# Patient Record
Sex: MALE | Race: WHITE | NOT HISPANIC OR LATINO | Employment: FULL TIME | URBAN - METROPOLITAN AREA
[De-identification: names, ages, dates, MRNs, and addresses within clinical notes are randomized per-mention and may not be internally consistent; named-entity substitution may affect disease eponyms.]

---

## 2018-10-03 ENCOUNTER — TELEPHONE (OUTPATIENT)
Dept: NEUROLOGY | Facility: CLINIC | Age: 48
End: 2018-10-03

## 2018-10-25 ENCOUNTER — OFFICE VISIT (OUTPATIENT)
Dept: NEUROLOGY | Facility: CLINIC | Age: 48
End: 2018-10-25
Payer: COMMERCIAL

## 2018-10-25 VITALS
SYSTOLIC BLOOD PRESSURE: 130 MMHG | WEIGHT: 310 LBS | BODY MASS INDEX: 45.91 KG/M2 | HEART RATE: 112 BPM | HEIGHT: 69 IN | DIASTOLIC BLOOD PRESSURE: 74 MMHG

## 2018-10-25 DIAGNOSIS — G62.9 NEUROPATHY: Primary | ICD-10-CM

## 2018-10-25 DIAGNOSIS — M54.16 RADICULOPATHY, LUMBAR REGION: ICD-10-CM

## 2018-10-25 PROCEDURE — 99244 OFF/OP CNSLTJ NEW/EST MOD 40: CPT | Performed by: PSYCHIATRY & NEUROLOGY

## 2018-10-25 RX ORDER — NAPROXEN 500 MG/1
400 TABLET ORAL 2 TIMES DAILY WITH MEALS
COMMUNITY

## 2018-10-25 NOTE — ASSESSMENT & PLAN NOTE
This patient recently had an incident at work, and has been having lower back pain radiating to the lower extremities, right more than more than the left, associated with slight weakness in the right ankle dorsiflexors, invertors and evertors consistent with a L5 radiculopathy  This is supported by his imaging studies which did show a disc herniation at L4-5 level, compressing the right more than the left neuroforamen  His electrodiagnostic studies did show active denervation at L4-S1 levels bilaterally, suggesting active radiculopathy however clinically he only has weakness in the right L5 distribution  Also was noted on his electrodiagnostic studies was absent sensory responses and decreased motor responses in both feet suggesting a generalized sensory motor polyneuropathy, which led to the referral today  Clinically, he did not have any symptoms of neuropathy prior to this incident, and this is an incidental finding  We talked about causes of neuropathy, and I told him the most common cause of neuropathy is diabetes  Beyond diabetes, metabolic syndrome with borderline blood sugars, blood pressure and dyslipidemia is one of the common causes of neuropathy which likely is the etiology in this patient  We talked about other reversible metabolic/autoimmune causes, and I recommended some blood work as noted below  He understands the role of lifestyle modification, regular exercise, regular small frequent meals with leaned protein, avoiding excess carbohydrates, weight loss in order to slow down the progression of neuropathy  I did tell him there is no medication which can improve the nerve function, the best way to control neuropathies is to control the underlying cause  So far, there is no role of immunotherapy  I will follow up on the above-mentioned testing, and he will continue to follow up with his surgeon for the lower back    He has been doing physical therapy and does feel the numbness and weakness have been getting better  I agree with the conservative approach, and encouraged him to continue physical therapy, as he has been doing  I will see him back for follow-up in 6 months, to ensure his neuropathy is not a rapidly progressive process

## 2018-10-25 NOTE — LETTER
October 25, 2018     Juan C Valdez MD  [address] [address]    Patient: Jorge Stephenson   YOB: 1970   Date of Visit: 10/25/2018       Dear Dr Villafana Simpler:    Thank you for referring Jorge Stephenson to me for evaluation  Below are my notes for this consultation  If you have questions, please do not hesitate to call me  I look forward to following your patient along with you  Sincerely,        Yaakov Calhoun MD        CC: Cristy Claire MD  10/25/2018  3:14 PM  Sign at close encounter  Patient ID: Jorge Stephenson is a 46 y o  male  Assessment/Plan:    Neuropathy  This patient recently had an incident at work, and has been having lower back pain radiating to the lower extremities, right more than more than the left, associated with slight weakness in the right ankle dorsiflexors, invertors and evertors consistent with a L5 radiculopathy  This is supported by his imaging studies which did show a disc herniation at L4-5 level, compressing the right more than the left neuroforamen  His electrodiagnostic studies did show active denervation at L4-S1 levels bilaterally, suggesting active radiculopathy however clinically he only has weakness in the right L5 distribution  Also was noted on his electrodiagnostic studies was absent sensory responses and decreased motor responses in both feet suggesting a generalized sensory motor polyneuropathy, which led to the referral today  Clinically, he did not have any symptoms of neuropathy prior to this incident, and this is an incidental finding  We talked about causes of neuropathy, and I told him the most common cause of neuropathy is diabetes  Beyond diabetes, metabolic syndrome with borderline blood sugars, blood pressure and dyslipidemia is one of the common causes of neuropathy which likely is the etiology in this patient   We talked about other reversible metabolic/autoimmune causes, and I recommended some blood work as noted below  He understands the role of lifestyle modification, regular exercise, regular small frequent meals with leaned protein, avoiding excess carbohydrates, weight loss in order to slow down the progression of neuropathy  I did tell him there is no medication which can improve the nerve function, the best way to control neuropathies is to control the underlying cause  So far, there is no role of immunotherapy  I will follow up on the above-mentioned testing, and he will continue to follow up with his surgeon for the lower back  He has been doing physical therapy and does feel the numbness and weakness have been getting better  I agree with the conservative approach, and encouraged him to continue physical therapy, as he has been doing  I will see him back for follow-up in 6 months, to ensure his neuropathy is not a rapidly progressive process  Diagnoses and all orders for this visit:    Neuropathy  -     Sedimentation rate, automated; Future  -     ELLEN Screen w/ Reflex to Titer/Pattern; Future  -     TSH, 3rd generation with Free T4 reflex; Future  -     Vitamin B12; Future  -     Vitamin B6; Future  -     Protein electrophoresis, serum; Future    Radiculopathy, lumbar region    Thank you very much for allowing me to participate in his care  Subjective:    HPI  I had the pleasure of seeing your patient in Neurology Clinic today  As you know, he is a 57-year-old man who was referred for evaluation for peripheral neuropathy  Please allow me to summarize his history for the record  He works in a farm, 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, 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  Shortly after, he started having lower back pain  The pain was dull for a few days and progressively got worse  The pain would radiate to the right leg, and he started noticing numbnmess in the right foot   A few weeks later, similac pain in the left leg, and intermittent numbness in the left foot  The pain in the legs is better, still has numbness in the feet, and is getting intermittent sharp pain in both buttocks  He feels right foot is weaker, feels it is gradually getting better  He was not able to walk without assistance initially when it happened, but is able to walk better now, without any assistive devices  No muscle cramps or twitching  Never had any numbness or tingling in his feet prior to the accident, no balance problems  Has h/o CTS b/l, had surgery a few years ago  H/O hep C 5-6 years ago, was treated  Does not have known DM, he has been told he is borderline  Borderline HTN  Former smoker, no family hx of any neuromuscular diseases  Brought in some blood work from 9/20, showed dyslipidemia, t cholesterol 204, HDL 36, ,   HCV RNA was neg, PSA was normal  TSH normal, hba1c was 5 7 CBC neg  He had electrodiagnostic studies in September 2018,   Her bilateral sural sensory responses were reported absent  Peroneal motor distal Cmaps were reduced bilaterally with reduced conduction velocity  Bilateral tibial motor see maps were also reduced with slightly slow conduction velocity  Increased insertional activity with fibrillations and positive sharp waves were reported in bilateral L4-5 muscles, and also were reported in bilateral L4-S1 paraspinal muscles  These are best interpreted as generalized, predominantly axonal sensory motor neuropathy, with superimposed radiculopathy  MRI of the lumbar spine was reported to show moderate degenerative disc disease at L4-5 and severe distally degenerated disc disease at L5-S1, very large disc protrusion was reported at L4-5, comprising the right more than the left neuroforamen      The following portions of the patient's history were reviewed and updated as appropriate: allergies, current medications, past family history, past medical history, past social history, past surgical history and problem list         Objective:    Blood pressure 130/74, pulse (!) 112, height 5' 9" (1 753 m), weight (!) 141 kg (310 lb)  Physical Exam  General exam: Pt was awake, alert and oriented  HEENT: atraumatic, normocephalic  Normal oral mucosa, neck was supple, no lymphadenopathy  Normal peripheral pulses  Extremities did not show any edema or cyanosis  Neurological Exam  Neurologically, pt was awake and alert  Speech was normal, no dysarthria or aphasia  Cranial nerve exam showed normal extraocular movements, no nystagmus or diplopia  There was no ptosis at baseline or with sustained upward gaze  Strength of eye closure muscles was normal   Facial sensations were normal bilaterally  No facial weakness, able to blow out the cheeks and push the tongue in the cheeks well  No tongue atrophy or fasciculations  Motor exam revealed normal tone and muscle bulk  There was no atrophy, scapular winging,high arches, hammertoes, shortening of achilles tendons or any other features of neuromuscular disease  Muscle strength was normal in neck flexors and extensors, and all muscle groups in both upper extremities  Strength in the lower extremities was as follows (right/left): Hip flexors 5/5, Knee extensors 5/5, knee flexors 5/5, ankle dorsiflexors 4/5, ankle plantar flexors 5/5, ankle invertors 4+/5, evertors 4/5  Reflexes were graded as 2 in the upper extremities, 2 at the knees and absent at the ankles  Toes were downgoing  There was no exaggerated jaw jerk or ortiz's sign  No ankle clonus  Sensory exam revealed length dependent, decreased sensation to pin and temp up to ankles bilaterally, has superimposed decreased sensation ion the L5 distribution, mainly in the lateral leg and dorsum of the right foot  Vibration was mildly reduced at toes  Proprioception was intact  Rhomberg showed mild swaying, with a 10 inch stance     He worked with slightly wide base, and slight steppage on right foot because of mild weakness  ROS:  I reviewed the below ROS and what is mentioned in HPI, the remainder of ROS was negative  Review of Systems   Constitutional: Negative  Negative for appetite change and fever  Recent weight gain   HENT: Negative  Negative for hearing loss, tinnitus, trouble swallowing and voice change  Eyes: Negative  Negative for photophobia and pain  Respiratory: Negative  Negative for shortness of breath  Cardiovascular: Negative  Negative for palpitations  Gastrointestinal: Negative  Negative for nausea and vomiting  Endocrine: Negative  Negative for cold intolerance and heat intolerance  Genitourinary: Negative  Negative for dysuria, frequency and urgency  Musculoskeletal: Positive for back pain  Negative for myalgias and neck pain  Skin: Negative  Negative for rash  Neurological: Positive for numbness (both feet)  Negative for dizziness, tremors, seizures, syncope, facial asymmetry, speech difficulty, weakness, light-headedness and headaches  Tingling, Pain while walking   Hematological: Negative  Does not bruise/bleed easily  Psychiatric/Behavioral: Negative  Negative for confusion, hallucinations and sleep disturbance

## 2018-10-25 NOTE — PROGRESS NOTES
Patient ID: Rashmi Liang is a 46 y o  male  Assessment/Plan:    Neuropathy  This patient recently had an incident at work, and has been having lower back pain radiating to the lower extremities, right more than more than the left, associated with slight weakness in the right ankle dorsiflexors, invertors and evertors consistent with a L5 radiculopathy  This is supported by his imaging studies which did show a disc herniation at L4-5 level, compressing the right more than the left neuroforamen  His electrodiagnostic studies did show active denervation at L4-S1 levels bilaterally, suggesting active radiculopathy however clinically he only has weakness in the right L5 distribution  Also was noted on his electrodiagnostic studies was absent sensory responses and decreased motor responses in both feet suggesting a generalized sensory motor polyneuropathy, which led to the referral today  Clinically, he did not have any symptoms of neuropathy prior to this incident, and this is an incidental finding  We talked about causes of neuropathy, and I told him the most common cause of neuropathy is diabetes  Beyond diabetes, metabolic syndrome with borderline blood sugars, blood pressure and dyslipidemia is one of the common causes of neuropathy which likely is the etiology in this patient  We talked about other reversible metabolic/autoimmune causes, and I recommended some blood work as noted below  He understands the role of lifestyle modification, regular exercise, regular small frequent meals with leaned protein, avoiding excess carbohydrates, weight loss in order to slow down the progression of neuropathy  I did tell him there is no medication which can improve the nerve function, the best way to control neuropathies is to control the underlying cause  So far, there is no role of immunotherapy       I will follow up on the above-mentioned testing, and he will continue to follow up with his surgeon for the lower back  He has been doing physical therapy and does feel the numbness and weakness have been getting better  I agree with the conservative approach, and encouraged him to continue physical therapy, as he has been doing  I will see him back for follow-up in 6 months, to ensure his neuropathy is not a rapidly progressive process  Diagnoses and all orders for this visit:    Neuropathy  -     Sedimentation rate, automated; Future  -     ELLEN Screen w/ Reflex to Titer/Pattern; Future  -     TSH, 3rd generation with Free T4 reflex; Future  -     Vitamin B12; Future  -     Vitamin B6; Future  -     Protein electrophoresis, serum; Future    Radiculopathy, lumbar region    Thank you very much for allowing me to participate in his care  Subjective:    HPI  I had the pleasure of seeing your patient in Neurology Clinic today  As you know, he is a 57-year-old man who was referred for evaluation for peripheral neuropathy  Please allow me to summarize his history for the record  He works in a farm, 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, 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  Shortly after, he started having lower back pain  The pain was dull for a few days and progressively got worse  The pain would radiate to the right leg, and he started noticing numbnmess in the right foot  A few weeks later, similac pain in the left leg, and intermittent numbness in the left foot  The pain in the legs is better, still has numbness in the feet, and is getting intermittent sharp pain in both buttocks  He feels right foot is weaker, feels it is gradually getting better  He was not able to walk without assistance initially when it happened, but is able to walk better now, without any assistive devices  No muscle cramps or twitching  Never had any numbness or tingling in his feet prior to the accident, no balance problems  Has h/o CTS b/l, had surgery a few years ago  H/O hep C 5-6 years ago, was treated   Does not have known DM, he has been told he is borderline  Borderline HTN  Former smoker, no family hx of any neuromuscular diseases  Brought in some blood work from 9/20, showed dyslipidemia, t cholesterol 204, HDL 36, ,   HCV RNA was neg, PSA was normal  TSH normal, hba1c was 5 7 CBC neg  He had electrodiagnostic studies in September 2018,   Her bilateral sural sensory responses were reported absent  Peroneal motor distal Cmaps were reduced bilaterally with reduced conduction velocity  Bilateral tibial motor see maps were also reduced with slightly slow conduction velocity  Increased insertional activity with fibrillations and positive sharp waves were reported in bilateral L4-5 muscles, and also were reported in bilateral L4-S1 paraspinal muscles  These are best interpreted as generalized, predominantly axonal sensory motor neuropathy, with superimposed radiculopathy  MRI of the lumbar spine was reported to show moderate degenerative disc disease at L4-5 and severe distally degenerated disc disease at L5-S1, very large disc protrusion was reported at L4-5, comprising the right more than the left neuroforamen  The following portions of the patient's history were reviewed and updated as appropriate: allergies, current medications, past family history, past medical history, past social history, past surgical history and problem list         Objective:    Blood pressure 130/74, pulse (!) 112, height 5' 9" (1 753 m), weight (!) 141 kg (310 lb)  Physical Exam  General exam: Pt was awake, alert and oriented  HEENT: atraumatic, normocephalic  Normal oral mucosa, neck was supple, no lymphadenopathy  Normal peripheral pulses  Extremities did not show any edema or cyanosis  Neurological Exam  Neurologically, pt was awake and alert  Speech was normal, no dysarthria or aphasia  Cranial nerve exam showed normal extraocular movements, no nystagmus or diplopia     There was no ptosis at baseline or with sustained upward gaze  Strength of eye closure muscles was normal   Facial sensations were normal bilaterally  No facial weakness, able to blow out the cheeks and push the tongue in the cheeks well  No tongue atrophy or fasciculations  Motor exam revealed normal tone and muscle bulk  There was no atrophy, scapular winging,high arches, hammertoes, shortening of achilles tendons or any other features of neuromuscular disease  Muscle strength was normal in neck flexors and extensors, and all muscle groups in both upper extremities  Strength in the lower extremities was as follows (right/left): Hip flexors 5/5, Knee extensors 5/5, knee flexors 5/5, ankle dorsiflexors 4/5, ankle plantar flexors 5/5, ankle invertors 4+/5, evertors 4/5  Reflexes were graded as 2 in the upper extremities, 2 at the knees and absent at the ankles  Toes were downgoing  There was no exaggerated jaw jerk or ortiz's sign  No ankle clonus  Sensory exam revealed length dependent, decreased sensation to pin and temp up to ankles bilaterally, has superimposed decreased sensation ion the L5 distribution, mainly in the lateral leg and dorsum of the right foot  Vibration was mildly reduced at toes  Proprioception was intact  Rhomberg showed mild swaying, with a 10 inch stance  He worked with slightly wide base, and slight steppage on right foot because of mild weakness  ROS:  I reviewed the below ROS and what is mentioned in HPI, the remainder of ROS was negative  Review of Systems   Constitutional: Negative  Negative for appetite change and fever  Recent weight gain   HENT: Negative  Negative for hearing loss, tinnitus, trouble swallowing and voice change  Eyes: Negative  Negative for photophobia and pain  Respiratory: Negative  Negative for shortness of breath  Cardiovascular: Negative  Negative for palpitations  Gastrointestinal: Negative  Negative for nausea and vomiting  Endocrine: Negative  Negative for cold intolerance and heat intolerance  Genitourinary: Negative  Negative for dysuria, frequency and urgency  Musculoskeletal: Positive for back pain  Negative for myalgias and neck pain  Skin: Negative  Negative for rash  Neurological: Positive for numbness (both feet)  Negative for dizziness, tremors, seizures, syncope, facial asymmetry, speech difficulty, weakness, light-headedness and headaches  Tingling, Pain while walking   Hematological: Negative  Does not bruise/bleed easily  Psychiatric/Behavioral: Negative  Negative for confusion, hallucinations and sleep disturbance

## 2018-10-26 ENCOUNTER — TELEPHONE (OUTPATIENT)
Dept: NEUROLOGY | Facility: CLINIC | Age: 48
End: 2018-10-26

## 2018-10-26 NOTE — TELEPHONE ENCOUNTER
----- Message from Keyanna Abarca sent at 10/26/2018  9:04 AM EDT -----  Regarding: Visit Follow-Up Question  Contact: [phone]  Hi, I wanted to see how long before the full office note will be available from yesterday's visit?     Thank you,  Ashley Ham

## 2018-10-30 LAB
ALBUMIN SERPL ELPH-MCNC: 4.2 G/DL (ref 2.9–4.4)
ALBUMIN/GLOB SERPL: 1.2 {RATIO} (ref 0.7–1.7)
ALPHA1 GLOB SERPL ELPH-MCNC: 0.2 G/DL (ref 0–0.4)
ALPHA2 GLOB SERPL ELPH-MCNC: 0.7 G/DL (ref 0.4–1)
ANA TITR SER IF: NEGATIVE {TITER}
B-GLOBULIN SERPL ELPH-MCNC: 1.2 G/DL (ref 0.7–1.3)
ERYTHROCYTE [SEDIMENTATION RATE] IN BLOOD BY WESTERGREN METHOD: 12 MM/HR (ref 0–30)
GAMMA GLOB SERPL ELPH-MCNC: 1.4 G/DL (ref 0.4–1.8)
GLOBULIN SER CALC-MCNC: 3.6 G/DL (ref 2.2–3.9)
LABORATORY COMMENT REPORT: NORMAL
M PROTEIN SERPL ELPH-MCNC: NORMAL G/DL
PROT SERPL-MCNC: 7.8 G/DL (ref 6–8.5)
SL AMB T4, FREE (DIRECT): 1.1 NG/DL (ref 0.82–1.77)
TSH SERPL DL<=0.005 MIU/L-ACNC: 4.69 UIU/ML (ref 0.45–4.5)
VIT B12 SERPL-MCNC: 643 PG/ML (ref 232–1245)
VIT B6 SERPL-MCNC: 64.6 UG/L (ref 5.3–46.7)

## 2018-11-02 ENCOUNTER — TELEPHONE (OUTPATIENT)
Dept: NEUROLOGY | Facility: CLINIC | Age: 48
End: 2018-11-02

## 2018-11-02 NOTE — TELEPHONE ENCOUNTER
----- Message from Rosalina Parson MD sent at 11/2/2018 11:07 AM EDT -----  Pls let the pt know his blood work showed mildly elevated vit b6 levels  Please ask him to stop any additional B6 supplements he is taking   Rest of the blood work was normal    Thanks      ----- Message -----  From: Danny, [facility] Lab Results In  Sent: 11/1/2018   9:46 AM  To: Rosalina Parson MD